# Patient Record
(demographics unavailable — no encounter records)

---

## 2024-11-04 NOTE — HEALTH RISK ASSESSMENT
[Good] : ~his/her~  mood as  good [No falls in past year] : Patient reported no falls in the past year [0] : 2) Feeling down, depressed, or hopeless: Not at all (0) [PHQ-2 Negative - No further assessment needed] : PHQ-2 Negative - No further assessment needed [Never] : Never [Yes] : Yes [2 - 3 times a week (3 pts)] : 2 - 3  times a week (3 points) [1 or 2 (0 pts)] : 1 or 2 (0 points) [Never (0 pts)] : Never (0 points) [No] : In the past 12 months have you used drugs other than those required for medical reasons? No [Audit-CScore] : 3 [[id number]] : 0 [Patient reported mammogram was normal] : Patient reported mammogram was normal [Patient reported PAP Smear was normal] : Patient reported PAP Smear was normal [Patient reported colonoscopy was normal] : Patient reported colonoscopy was normal [HIV test declined] : HIV test declined [Hepatitis C test declined] : Hepatitis C test declined [Change in mental status noted] : No change in mental status noted [Language] : denies difficulty with language [Behavior] : denies difficulty with behavior [Learning/Retaining New Information] : denies difficulty learning/retaining new information [Handling Complex Tasks] : denies difficulty handling complex tasks [Reasoning] : denies difficulty with reasoning [Spatial Ability and Orientation] : denies difficulty with spatial ability and orientation [None] : None [Sexually Active] : sexually active [Feels Safe at Home] : Feels safe at home [Fully functional (bathing, dressing, toileting, transferring, walking, feeding)] : Fully functional (bathing, dressing, toileting, transferring, walking, feeding) [Fully functional (using the telephone, shopping, preparing meals, housekeeping, doing laundry, using] : Fully functional and needs no help or supervision to perform IADLs (using the telephone, shopping, preparing meals, housekeeping, doing laundry, using transportation, managing medications and managing finances) [Reports changes in hearing] : Reports no changes in hearing [Reports changes in vision] : Reports no changes in vision [Reports changes in dental health] : Reports no changes in dental health [Smoke Detector] : smoke detector [Safety elements used in home] : safety elements used in home [Seat Belt] :  uses seat belt [MammogramDate] : 2024 [PapSmearDate] : 2024 [ColonoscopyDate] : 2022

## 2024-11-04 NOTE — ASSESSMENT
[FreeTextEntry1] : 56 year is here for a CPE. She was counselled on diet and exercise, drug and alcohol use, age appropriate health care maintenance including vaccines, seatbelts, sunscreen, stress reduction and safe sex. All questions asked/answered to the best of my ability. Labs sent. TDAP given.,

## 2024-11-04 NOTE — HISTORY OF PRESENT ILLNESS
[de-identified] : 57 y/o female presents fpor CPE and to estbalish care. H/o hhypothyroidism and nodule-needs f/u. H/o small pulm nodule-told didn't need f/u.  UTD with HCM.   Daughter expecting baby and doesn't recall TDAP.  Had shingrix/flu/COVID vaccines.

## 2025-01-27 NOTE — ASSESSMENT
[FreeTextEntry1] : 1. Will check full TFTs and consider adding T3 pending results. 2. Start Zepbound 2.5 mg IM q week. Titrate up as per schedule. Risks/enefits/side effects discussed.

## 2025-01-27 NOTE — HEALTH RISK ASSESSMENT
[0] : 2) Feeling down, depressed, or hopeless: Not at all (0) [PHQ-2 Negative - No further assessment needed] : PHQ-2 Negative - No further assessment needed [Never] : Never [[id number]] : 0

## 2025-01-27 NOTE — PHYSICAL EXAM
[No Acute Distress] : no acute distress [Normal Sclera/Conjunctiva] : normal sclera/conjunctiva [Normal Outer Ear/Nose] : the outer ears and nose were normal in appearance [Thyroid Normal, No Nodules] : the thyroid was normal and there were no nodules present [No Respiratory Distress] : no respiratory distress  [Normal Rate] : normal rate  [Normal] : affect was normal and insight and judgment were intact

## 2025-01-27 NOTE — HISTORY OF PRESENT ILLNESS
[de-identified] : Pt trying very hard to lose weight-diet consists primarily of vegetables and lean protein. Exercises.  On Synthroid. Friend on Candelaria shetty and she wants to consider T3 dosing as well.  Also wants to discuss GLP-1 agonist therapy for obesity.

## 2025-01-30 NOTE — ASSESSMENT
[FreeTextEntry1] : 1. Uncomplicated UTI. Urinate post-sex. If she continues to get more UTIs can discuss ppx. Start Bactrim DS BID x 3 days. Urine culture sent.  2. Continue current thyroid regimne. f/u 8-10 weeks.  3. start Zepbound!

## 2025-01-30 NOTE — REVIEW OF SYSTEMS
[Dysuria] : dysuria [Incontinence] : no incontinence [Hematuria] : no hematuria [Frequency] : no frequency [Negative] : Heme/Lymph

## 2025-01-30 NOTE — HISTORY OF PRESENT ILLNESS
[FreeTextEntry8] : 57 y/o female presents for dysuria since this AM. No fever, chills. Has only had two UTIs in the past (most recently in 11/24). No n/v. No frequency.   Of note, recently here for thyroid management. T3 found to be slightly low (with normal TSH).  Cytomel added. Felt "speedy" yesterday but ok today.  Zepbouprettyd appreoced. Hasn't started yet.

## 2025-01-30 NOTE — PHYSICAL EXAM
[Normal Sclera/Conjunctiva] : normal sclera/conjunctiva [Normal Outer Ear/Nose] : the outer ears and nose were normal in appearance [Non Tender] : non-tender [Non-distended] : non-distended [No CVA Tenderness] : no CVA  tenderness [Normal] : affect was normal and insight and judgment were intact

## 2025-03-10 NOTE — HISTORY OF PRESENT ILLNESS
[de-identified] : Here for f/u. Losing weight slowly. Ready for GLP-1 dose increase. Needs thyroid med management as we added T3 2 months ago. Feels great.

## 2025-03-10 NOTE — ASSESSMENT
[FreeTextEntry1] : 7 minutes were spent adminsitering an evidence based ASCVD Risk Assessment tool showing patient's risk being 2.53% and discussing results with patient including lifestyle modificationms to be taken. Weight loss occuring slowly. Will increase Zepbound to 5 mg and f/u 8 weeks. TFTs checked. Will titrate dose as needed.

## 2025-03-10 NOTE — PHYSICAL EXAM
[Normal Rate] : normal rate  [No Edema] : there was no peripheral edema [Normal] : affect was normal and insight and judgment were intact

## 2025-05-13 NOTE — HISTORY OF PRESENT ILLNESS
[de-identified] : Here for f/u on multiple issues. Feels "So much better". Lost ~13 pounds total but appears to have plateaued. Has had three UTIs since the fall- all a/w travel. Never had prior to this.

## 2025-05-13 NOTE — ASSESSMENT
[FreeTextEntry1] : -BP med requirement lessened due to weight loss. Will change Benicar to Olmesartan 20 mg.  -check labs -increase Zepbound to 7.5 mg q week and f/u 8 weeks -Continue current htyroid regimen -Will follow recurrent UTIs. Discussed options for treatment/prevention including: post-sex urination, aggressive hydration, post-coital Abx, topical E2. She is going to monitor symptoms over the next few months and decide

## 2025-07-08 NOTE — ASSESSMENT
[FreeTextEntry1] : Will increase Zepbound to 10 mg a week. Will f/u 8 weeks and see if she wants to titrate up further. Will decrease Benicar to 5 mg and see how she does. f/u 1-2 weeks. Check TFTs. Titrate dose as needed.

## 2025-07-08 NOTE — HISTORY OF PRESENT ILLNESS
[de-identified] : Here for f/u on multiple issues. Needs thyroid checked. Due for Zepbound titration.  Lost total of 16 pounds (~10% TBW).  Wants to titrate BP meds.  Feels well.

## 2025-07-08 NOTE — PHYSICAL EXAM
[No Acute Distress] : no acute distress [Normal Sclera/Conjunctiva] : normal sclera/conjunctiva [Normal Outer Ear/Nose] : the outer ears and nose were normal in appearance [No Respiratory Distress] : no respiratory distress  [Normal] : normal gait, coordination grossly intact, no focal deficits and deep tendon reflexes were 2+ and symmetric